# Patient Record
Sex: FEMALE | Race: OTHER | Employment: FULL TIME | URBAN - METROPOLITAN AREA
[De-identification: names, ages, dates, MRNs, and addresses within clinical notes are randomized per-mention and may not be internally consistent; named-entity substitution may affect disease eponyms.]

---

## 2017-04-05 ENCOUNTER — OFFICE VISIT (OUTPATIENT)
Dept: INTERNAL MEDICINE CLINIC | Facility: CLINIC | Age: 52
End: 2017-04-05

## 2017-04-05 ENCOUNTER — LAB ENCOUNTER (OUTPATIENT)
Dept: LAB | Age: 52
End: 2017-04-05
Attending: INTERNAL MEDICINE
Payer: COMMERCIAL

## 2017-04-05 VITALS — SYSTOLIC BLOOD PRESSURE: 124 MMHG | RESPIRATION RATE: 18 BRPM | TEMPERATURE: 98 F | DIASTOLIC BLOOD PRESSURE: 80 MMHG

## 2017-04-05 DIAGNOSIS — K21.9 GASTROESOPHAGEAL REFLUX DISEASE, ESOPHAGITIS PRESENCE NOT SPECIFIED: ICD-10-CM

## 2017-04-05 DIAGNOSIS — Z12.11 COLON CANCER SCREENING: ICD-10-CM

## 2017-04-05 DIAGNOSIS — R51.9 PERSISTENT HEADACHES: ICD-10-CM

## 2017-04-05 DIAGNOSIS — Z00.00 ANNUAL PHYSICAL EXAM: Primary | ICD-10-CM

## 2017-04-05 DIAGNOSIS — Z00.00 ANNUAL PHYSICAL EXAM: ICD-10-CM

## 2017-04-05 PROCEDURE — 99386 PREV VISIT NEW AGE 40-64: CPT | Performed by: INTERNAL MEDICINE

## 2017-04-05 PROCEDURE — 85025 COMPLETE CBC W/AUTO DIFF WBC: CPT

## 2017-04-05 PROCEDURE — 36415 COLL VENOUS BLD VENIPUNCTURE: CPT

## 2017-04-05 PROCEDURE — 80061 LIPID PANEL: CPT

## 2017-04-05 PROCEDURE — 85652 RBC SED RATE AUTOMATED: CPT

## 2017-04-05 PROCEDURE — 83036 HEMOGLOBIN GLYCOSYLATED A1C: CPT

## 2017-04-05 PROCEDURE — 83690 ASSAY OF LIPASE: CPT

## 2017-04-05 PROCEDURE — 99213 OFFICE O/P EST LOW 20 MIN: CPT | Performed by: INTERNAL MEDICINE

## 2017-04-05 PROCEDURE — 80053 COMPREHEN METABOLIC PANEL: CPT

## 2017-04-05 RX ORDER — PANTOPRAZOLE SODIUM 40 MG/1
40 TABLET, DELAYED RELEASE ORAL
Qty: 90 TABLET | Refills: 0 | Status: SHIPPED | OUTPATIENT
Start: 2017-04-05 | End: 2017-08-05

## 2017-04-05 NOTE — PROGRESS NOTES
HPI:    Patient ID: Rachel Murillo is a 45year old female. HPI Comments: Patient also here for physical.    Gastro-esophageal Reflux  She complains of belching, heartburn and nausea. She reports no coughing, no dysphagia or no hoarse voice.  This is a r nausea. Negative for dysphagia, vomiting, diarrhea, constipation, blood in stool and anal bleeding. Acid regurgitation to her mouth   Genitourinary: Negative. Neurological: Positive for headaches.  Negative for syncope, speech difficulty, weakness normal.   Reflex Scores:       Tricep reflexes are 2+ on the right side and 2+ on the left side. Bicep reflexes are 2+ on the right side and 2+ on the left side. Brachioradialis reflexes are 2+ on the right side and 2+ on the left side.        P

## 2017-04-12 ENCOUNTER — TELEPHONE (OUTPATIENT)
Dept: INTERNAL MEDICINE CLINIC | Facility: CLINIC | Age: 52
End: 2017-04-12

## 2017-04-12 DIAGNOSIS — E78.5 ELEVATED LIPIDS: Primary | ICD-10-CM

## 2017-04-12 DIAGNOSIS — R73.03 PREDIABETES: ICD-10-CM

## 2017-04-12 NOTE — TELEPHONE ENCOUNTER
LMTCB, ok to trans to [phone] today only or [id number]. Notes Recorded by Aguila Pacheco RN on 4/11/2017 at 4:32 PM  Called pt voicemail is full.   Notes Recorded by Randy Myers MD on 4/10/2017 at 8:20 AM  Notify pt; her a1c was 5.9 so she is prediab

## 2017-08-03 ENCOUNTER — TELEPHONE (OUTPATIENT)
Dept: INTERNAL MEDICINE CLINIC | Facility: CLINIC | Age: 52
End: 2017-08-03

## 2017-08-03 NOTE — TELEPHONE ENCOUNTER
Pt calling to request refill for       Current Outpatient Prescriptions:  Pantoprazole Sodium 40 MG Oral Tab EC Take 1 tablet (40 mg total) by mouth every morning before breakfast. Disp: 90 tablet Rfl: 0

## 2017-08-05 RX ORDER — PANTOPRAZOLE SODIUM 40 MG/1
40 TABLET, DELAYED RELEASE ORAL
Qty: 90 TABLET | Refills: 0 | Status: SHIPPED | OUTPATIENT
Start: 2017-08-05 | End: 2018-07-31

## 2017-08-05 NOTE — TELEPHONE ENCOUNTER
Refilled per written protocol.     Refill Protocol Appointment Criteria Gastrointestional Medication:    · Appointment scheduled in the past 12 months or in the next 3 months  Recent Outpatient Visits            4 months ago Annual physical exam    [place]

## 2019-06-20 ENCOUNTER — OFFICE VISIT (OUTPATIENT)
Dept: INTERNAL MEDICINE CLINIC | Facility: CLINIC | Age: 54
End: 2019-06-20
Payer: COMMERCIAL

## 2019-06-20 ENCOUNTER — HOSPITAL ENCOUNTER (OUTPATIENT)
Dept: GENERAL RADIOLOGY | Age: 54
Discharge: HOME | End: 2019-06-20
Attending: INTERNAL MEDICINE
Payer: COMMERCIAL

## 2019-06-20 VITALS
BODY MASS INDEX: 23.78 KG/M2 | WEIGHT: 134.19 LBS | RESPIRATION RATE: 18 BRPM | DIASTOLIC BLOOD PRESSURE: 79 MMHG | HEART RATE: 85 BPM | HEIGHT: 63 IN | SYSTOLIC BLOOD PRESSURE: 126 MMHG | OXYGEN SATURATION: 100 % | TEMPERATURE: 99 F

## 2019-06-20 DIAGNOSIS — R05.9 COUGH: ICD-10-CM

## 2019-06-20 DIAGNOSIS — R05.9 COUGH: Primary | ICD-10-CM

## 2019-06-20 PROCEDURE — 71046 X-RAY EXAM CHEST 2 VIEWS: CPT | Performed by: INTERNAL MEDICINE

## 2019-06-20 PROCEDURE — 99214 OFFICE O/P EST MOD 30 MIN: CPT | Performed by: INTERNAL MEDICINE

## 2019-06-20 PROCEDURE — 99212 OFFICE O/P EST SF 10 MIN: CPT | Performed by: INTERNAL MEDICINE

## 2019-06-20 RX ORDER — ATORVASTATIN CALCIUM 40 MG/1
40 TABLET, FILM COATED ORAL NIGHTLY
COMMUNITY
End: 2020-10-16

## 2019-06-20 RX ORDER — PANTOPRAZOLE SODIUM 40 MG/1
40 TABLET, DELAYED RELEASE ORAL
Qty: 90 TABLET | Refills: 0 | Status: SHIPPED | OUTPATIENT
Start: 2019-06-20 | End: 2020-10-16

## 2019-06-20 RX ORDER — CODEINE PHOSPHATE AND GUAIFENESIN 10; 100 MG/5ML; MG/5ML
5 SOLUTION ORAL EVERY 6 HOURS PRN
Qty: 240 ML | Refills: 0 | Status: SHIPPED | OUTPATIENT
Start: 2019-06-20 | End: 2020-10-16

## 2019-06-20 NOTE — PROGRESS NOTES
HPI:    Patient ID: Omaira Gomez is a 46year old female. Cough   This is a new problem. The current episode started in the past 7 days. The problem has been unchanged. The problem occurs every few hours. The cough is non-productive.  Associated symp the right lower field. She has no wheezes. She has no rales. Abdominal: Soft. Bowel sounds are normal. She exhibits no distension and no mass. There is no tenderness. There is no rebound and no guarding. Musculoskeletal: Normal range of motion.  She exh

## 2020-10-16 ENCOUNTER — NURSE TRIAGE (OUTPATIENT)
Dept: INTERNAL MEDICINE CLINIC | Facility: CLINIC | Age: 55
End: 2020-10-16

## 2020-10-16 ENCOUNTER — OFFICE VISIT (OUTPATIENT)
Dept: FAMILY MEDICINE CLINIC | Facility: CLINIC | Age: 55
End: 2020-10-16
Payer: COMMERCIAL

## 2020-10-16 VITALS
HEART RATE: 81 BPM | DIASTOLIC BLOOD PRESSURE: 76 MMHG | HEIGHT: 63 IN | WEIGHT: 135 LBS | SYSTOLIC BLOOD PRESSURE: 128 MMHG | BODY MASS INDEX: 23.92 KG/M2

## 2020-10-16 DIAGNOSIS — K21.9 GASTROESOPHAGEAL REFLUX DISEASE, UNSPECIFIED WHETHER ESOPHAGITIS PRESENT: ICD-10-CM

## 2020-10-16 DIAGNOSIS — R42 DIZZINESS: ICD-10-CM

## 2020-10-16 DIAGNOSIS — Z12.11 SCREENING FOR COLON CANCER: ICD-10-CM

## 2020-10-16 DIAGNOSIS — R03.0 SINGLE EPISODE OF ELEVATED BLOOD PRESSURE: ICD-10-CM

## 2020-10-16 DIAGNOSIS — Z00.00 ANNUAL PHYSICAL EXAM: Primary | ICD-10-CM

## 2020-10-16 DIAGNOSIS — Z12.31 ENCOUNTER FOR SCREENING MAMMOGRAM FOR MALIGNANT NEOPLASM OF BREAST: ICD-10-CM

## 2020-10-16 PROCEDURE — 99386 PREV VISIT NEW AGE 40-64: CPT | Performed by: NURSE PRACTITIONER

## 2020-10-16 PROCEDURE — 3008F BODY MASS INDEX DOCD: CPT | Performed by: NURSE PRACTITIONER

## 2020-10-16 PROCEDURE — 99203 OFFICE O/P NEW LOW 30 MIN: CPT | Performed by: NURSE PRACTITIONER

## 2020-10-16 PROCEDURE — 3078F DIAST BP <80 MM HG: CPT | Performed by: NURSE PRACTITIONER

## 2020-10-16 PROCEDURE — 3074F SYST BP LT 130 MM HG: CPT | Performed by: NURSE PRACTITIONER

## 2020-10-16 RX ORDER — PANTOPRAZOLE SODIUM 40 MG/1
40 TABLET, DELAYED RELEASE ORAL
Qty: 90 TABLET | Refills: 0 | Status: SHIPPED | OUTPATIENT
Start: 2020-10-16

## 2020-10-16 NOTE — PATIENT INSTRUCTIONS
Prevention Guidelines, Women Ages 48 to 59  Screening tests and vaccines are an important part of managing your health. A screening test is done to find possible disorders or diseases in people who don't have any symptoms.  The goal is to find a disease e Colorectal cancer All women at average risk in this age group Multiple tests are available and are used at different times.  Possible tests include:  · Flexible sigmoidoscopy every 5 years, or  · Colonoscopy every 10 years, or  · CT colonography (virtual co Chickenpox (varicella) All women in this age group who have no record of this infection or vaccine 2 doses; the second dose should be given at least 4 weeks after the first dose   Hepatitis A Women at increased risk for infection – talk with your healthcar Diet and exercise Women who are overweight or obese When diagnosed, and then at routine exams   Sexually transmitted infection prevention Women at increased risk for infection – talk with your healthcare provider At routine exams   Use of daily aspirin Wom © 9926-3364 [employer] [address] [facility], [facility] [place]. All rights reserved. This information is not intended as a substitute for professional medical care. Always follow your healthcare professional's instructions.         [employer] Dara last reviewed this educational content on 4/1/2020  © 8496-9486 [address]. [address] [facility] [place], [facility] [place]. All rights reserved. This information is not intended as a substitute for professional medical care.  Always magoo

## 2020-10-16 NOTE — PROGRESS NOTES
HPI  Pt here for elevated bp 178/94 this am   This is first time bp has been running high. Has been under a lot of stress.      Has not been to a doctor in a while-last visit 1 1/2 years ago  Over due for labs, mammogram  Last pap-over 4/25/2018-pap neg but Surgical History:   Procedure Laterality Date   • Rx ectop preg by laparoscope  2003    laproscopic salpingOSTOMY       Family History   Problem Relation Age of Onset   • Stroke Mother    • Diabetes Mother    • Lipids Mother    • Hypertension Mother Asked        Back Care: Not Asked        Exercise: Not Asked        Bike Helmet: Not Asked        Seat Belt: Not Asked        Self-Exams: Not Asked    Social History Narrative      Not on file      Current Outpatient Medications   Medication Sig Dispense R foods.  Try to get at least 150 minutes of exercise per week-a combination of weight resistance and cardio is preferred.     Screening labs  Mammogram ordered  Had flu shot  Refer colonoscopy           Relevant Orders    CBC, PLATELET; NO DIFFERENTIAL    CO

## 2020-10-17 ENCOUNTER — APPOINTMENT (OUTPATIENT)
Dept: LAB | Age: 55
End: 2020-10-17
Attending: NURSE PRACTITIONER
Payer: COMMERCIAL

## 2020-10-17 ENCOUNTER — LAB ENCOUNTER (OUTPATIENT)
Dept: LAB | Age: 55
End: 2020-10-17
Attending: NURSE PRACTITIONER
Payer: COMMERCIAL

## 2020-10-17 DIAGNOSIS — Z00.00 ANNUAL PHYSICAL EXAM: ICD-10-CM

## 2020-10-17 DIAGNOSIS — R03.0 SINGLE EPISODE OF ELEVATED BLOOD PRESSURE: ICD-10-CM

## 2020-10-17 PROCEDURE — 93010 ELECTROCARDIOGRAM REPORT: CPT | Performed by: NURSE PRACTITIONER

## 2020-10-17 PROCEDURE — 84443 ASSAY THYROID STIM HORMONE: CPT

## 2020-10-17 PROCEDURE — 80061 LIPID PANEL: CPT

## 2020-10-17 PROCEDURE — 82306 VITAMIN D 25 HYDROXY: CPT

## 2020-10-17 PROCEDURE — 82607 VITAMIN B-12: CPT

## 2020-10-17 PROCEDURE — 83036 HEMOGLOBIN GLYCOSYLATED A1C: CPT

## 2020-10-17 PROCEDURE — 85027 COMPLETE CBC AUTOMATED: CPT

## 2020-10-17 PROCEDURE — 36415 COLL VENOUS BLD VENIPUNCTURE: CPT

## 2020-10-17 PROCEDURE — 93005 ELECTROCARDIOGRAM TRACING: CPT

## 2020-10-17 PROCEDURE — 84481 FREE ASSAY (FT-3): CPT

## 2020-10-17 PROCEDURE — 84439 ASSAY OF FREE THYROXINE: CPT

## 2020-10-17 PROCEDURE — 80053 COMPREHEN METABOLIC PANEL: CPT

## 2020-10-20 ENCOUNTER — TELEPHONE (OUTPATIENT)
Dept: INTERNAL MEDICINE CLINIC | Facility: CLINIC | Age: 55
End: 2020-10-20

## 2020-10-20 DIAGNOSIS — I10 ESSENTIAL HYPERTENSION: ICD-10-CM

## 2020-10-20 DIAGNOSIS — E78.00 HYPERCHOLESTEROLEMIA: Primary | ICD-10-CM

## 2020-10-20 DIAGNOSIS — R73.09 ELEVATED HEMOGLOBIN A1C: ICD-10-CM

## 2020-10-20 DIAGNOSIS — E05.90 HYPERTHYROIDISM: ICD-10-CM

## 2020-10-20 DIAGNOSIS — R79.9 ELEVATED BUN: ICD-10-CM

## 2020-10-20 RX ORDER — ATORVASTATIN CALCIUM 10 MG/1
10 TABLET, FILM COATED ORAL NIGHTLY
Qty: 90 TABLET | Refills: 3 | Status: SHIPPED | OUTPATIENT
Start: 2020-10-20

## 2020-10-20 RX ORDER — LISINOPRIL AND HYDROCHLOROTHIAZIDE 12.5; 1 MG/1; MG/1
1 TABLET ORAL DAILY
Qty: 90 TABLET | Refills: 3 | Status: SHIPPED | OUTPATIENT
Start: 2020-10-20 | End: 2021-10-15

## 2020-10-20 NOTE — TELEPHONE ENCOUNTER
Pt was called and informed of message below and she verbalized understanding and appt was made.        Future Appointments   Date Time Provider Carol Baugh   10/21/2020  5:45 PM WALI De Oliveira [facility]     Written by Long Chang

## 2020-10-20 NOTE — TELEPHONE ENCOUNTER
Pt is anxious to know the results of her 10/17 labs as she states that her b/p is high 158/91 today and she thinks her cholesterol is high too. Please advise.

## 2020-10-20 NOTE — TELEPHONE ENCOUNTER
Ariela Garcia pt is calling again see April message below. Pt is very anxious to know her results but she will like to know  if you can prescribe her blood pressure medication because her blood was 158//94 and she does not feel well. She stated that she feels numb

## 2020-10-29 ENCOUNTER — OFFICE VISIT (OUTPATIENT)
Dept: INTERNAL MEDICINE CLINIC | Facility: CLINIC | Age: 55
End: 2020-10-29
Payer: COMMERCIAL

## 2020-10-29 VITALS
SYSTOLIC BLOOD PRESSURE: 120 MMHG | WEIGHT: 130 LBS | DIASTOLIC BLOOD PRESSURE: 76 MMHG | BODY MASS INDEX: 23 KG/M2 | HEART RATE: 85 BPM

## 2020-10-29 DIAGNOSIS — S46.911A STRAIN OF RIGHT SHOULDER, INITIAL ENCOUNTER: ICD-10-CM

## 2020-10-29 DIAGNOSIS — S16.1XXA STRAIN OF NECK MUSCLE, INITIAL ENCOUNTER: ICD-10-CM

## 2020-10-29 DIAGNOSIS — I10 ESSENTIAL HYPERTENSION: Primary | ICD-10-CM

## 2020-10-29 PROCEDURE — 3074F SYST BP LT 130 MM HG: CPT | Performed by: INTERNAL MEDICINE

## 2020-10-29 PROCEDURE — 3078F DIAST BP <80 MM HG: CPT | Performed by: INTERNAL MEDICINE

## 2020-10-29 PROCEDURE — 99214 OFFICE O/P EST MOD 30 MIN: CPT | Performed by: INTERNAL MEDICINE

## 2020-10-29 PROCEDURE — 99212 OFFICE O/P EST SF 10 MIN: CPT | Performed by: INTERNAL MEDICINE

## 2020-10-29 NOTE — PROGRESS NOTES
HPI:    Patient ID: Manuel Townsend is a 53year old female. Neck Pain   This is a new (right sided neck pain radiating to right shoulder) problem.  The current episode started today (pt had been takng care of mother, also did lot of food prep and cooki History   Problem Relation Age of Onset   • Stroke Mother    • Diabetes Mother    • Lipids Mother    • Hypertension Mother       Social History: Social History    Tobacco Use      Smoking status: Never Smoker      Smokeless tobacco: Never Used    Alcohol u use otc aspercreme with lidocaine and also aleve. Gave her note for work. Call back if pain persist/worsens.     (S46.628S) Strain of right shoulder, initial encounter  Plan: see above.        (I10) Essential hypertension  Plan: bp now controlled since star

## 2021-04-12 ENCOUNTER — TELEPHONE (OUTPATIENT)
Dept: INTERNAL MEDICINE CLINIC | Facility: CLINIC | Age: 56
End: 2021-04-12

## 2021-04-12 DIAGNOSIS — Z12.11 COLON CANCER SCREENING: Primary | ICD-10-CM

## 2021-04-12 DIAGNOSIS — Z01.419 WELL FEMALE EXAM WITH ROUTINE GYNECOLOGICAL EXAM: ICD-10-CM

## 2021-04-12 NOTE — TELEPHONE ENCOUNTER
pls call pt  She needs to do her mammogram and also screening colonoscopy. Also needs to see gyne for her papsmear  Referrals/order already in epic.

## 2021-04-15 NOTE — TELEPHONE ENCOUNTER
Called patient and left message for her to give us a call back to give her needed testing information.

## 2021-12-07 NOTE — TELEPHONE ENCOUNTER
Action Requested: Summary for Provider     []  Critical Lab, Recommendations Needed  [] Need Additional Advice  []   FYI    []   Need Orders  [] Need Medications Sent to Pharmacy  []  Other     SUMMARY: Per protocol advised office visit.   Scheduled today w Complex Repair And Bilobe Flap Text: The defect edges were debeveled with a #15 scalpel blade.  The primary defect was closed partially with a complex linear closure.  Given the location of the remaining defect, shape of the defect and the proximity to free margins a bilobe flap was deemed most appropriate for complete closure of the defect.  Using a sterile surgical marker, an appropriate advancement flap was drawn incorporating the defect and placing the expected incisions within the relaxed skin tension lines where possible.    The area thus outlined was incised deep to adipose tissue with a #15 scalpel blade.  The skin margins were undermined to an appropriate distance in all directions utilizing iris scissors.

## 2022-01-04 LAB — AMB EXT COVID-19 RESULT: DETECTED

## 2022-01-06 ENCOUNTER — TELEPHONE (OUTPATIENT)
Dept: INTERNAL MEDICINE CLINIC | Facility: CLINIC | Age: 57
End: 2022-01-06

## 2022-01-06 NOTE — TELEPHONE ENCOUNTER
Pt stated she tested positive for Covid 1/4/22,   S/s cough , sore throat,nasal congestion, advised to hydration ,quarrantine , treat s/s tylenol, saline gargle, cough syrup, appt made for RTW

## 2022-01-11 ENCOUNTER — TELEMEDICINE (OUTPATIENT)
Dept: INTERNAL MEDICINE CLINIC | Facility: CLINIC | Age: 57
End: 2022-01-11
Payer: COMMERCIAL

## 2022-01-11 DIAGNOSIS — U07.1 COVID-19 VIRUS INFECTION: Primary | ICD-10-CM

## 2022-01-11 PROCEDURE — 99212 OFFICE O/P EST SF 10 MIN: CPT | Performed by: INTERNAL MEDICINE

## 2022-01-11 NOTE — PROGRESS NOTES
Telephone Check-In    John Coley verbally consents to a Virtual/Telephone Check-In service on 01/11/22. Patient understands and accepts financial responsibility for any deductible, co-insurance and/or co-pays associated with this service.     Jose Dionte Velazco understands phone evaluation is not a substitute for face-to-face examination or emergency care. Patient advised to go to ER or call 911 for worsening symptoms or acute distress.

## 2023-10-10 LAB — AMB EXT COVID-19 RESULT: DETECTED

## 2023-10-13 ENCOUNTER — TELEPHONE (OUTPATIENT)
Dept: INTERNAL MEDICINE CLINIC | Facility: CLINIC | Age: 58
End: 2023-10-13

## 2023-10-13 NOTE — TELEPHONE ENCOUNTER
Pt stated tested Positive Covid on Tuesday- s/s started Tuesday  Cough, sore throat, runny nose , body ache only the first day  Taking nyquil seem to help, taking Vit C-cough is less, still have runny nose  Job allowing to come back after 10 days   Stated she is feeling better but not allowed to return until then  Stated will need letter from Dr to RTW    No appts available next week for video visit or OV

## 2023-10-13 NOTE — TELEPHONE ENCOUNTER
Ok to give letter for work  after 10 days from start of her illness as per her request.   Also remind pt overdue for her annual physical and health screening tests like mammogram, colonoscopy, and papsmear

## 2024-10-25 ENCOUNTER — TELEPHONE (OUTPATIENT)
Dept: INTERNAL MEDICINE CLINIC | Facility: CLINIC | Age: 59
End: 2024-10-25

## 2024-10-25 NOTE — TELEPHONE ENCOUNTER
Patient called in and was sent home today from work since she tested positive for covid. Patient said they gave her 10 days to see Dr with negative result. When holding for triage patient hung up. Please advise

## 2024-10-28 ENCOUNTER — TELEMEDICINE (OUTPATIENT)
Dept: INTERNAL MEDICINE CLINIC | Facility: CLINIC | Age: 59
End: 2024-10-28

## 2024-10-28 DIAGNOSIS — U07.1 COVID-19: Primary | ICD-10-CM

## 2024-10-28 NOTE — PROGRESS NOTES
Virtual Visit Check-In    MEENU SHORE or legal guardian   verbally consents to a Virtual Visit Check-In service on 10/28/2024    Patient understands and accepts financial responsibility for any deductible, co-insurance and/or co-pays associated with this service.    Duration of the service:   Call duration 10 minutes   Video visit    Chief Complaint   Patient presents with    Covid       HPI:    Patient ID: Meenu Shore is a 58 year old female. She presents for follow up. She tested positive for Covid-19 on 10/22/2024. She is a nurse at [employer]. She is requesting a letter to return to work. Per her place of employment she needs to be off for 10 days. Return to work on 11/2/2024. She did test negative on Sunday 11/27. She is not having any symptoms currently. She denies any SOB, chest pain or fevers.     ROS    Review of Systems   Constitutional:  Negative for fever.   HENT: Negative.     Respiratory:  Negative for cough, shortness of breath and wheezing.    Cardiovascular:  Negative for chest pain.   Gastrointestinal: Negative.    Genitourinary: Negative.    Musculoskeletal:  Negative for arthralgias.   Skin: Negative.    Neurological: Negative.    Psychiatric/Behavioral: Negative.               Current Outpatient Medications   Medication Sig Dispense Refill    atorvastatin 10 MG Oral Tab Take 1 tablet (10 mg total) by mouth nightly. 90 tablet 3    Pantoprazole Sodium 40 MG Oral Tab EC Take 1 tablet (40 mg total) by mouth every morning before breakfast. 90 tablet 0       Allergies:Allergies[1]       Current Outpatient Medications:     atorvastatin 10 MG Oral Tab, Take 1 tablet (10 mg total) by mouth nightly., Disp: 90 tablet, Rfl: 3    Pantoprazole Sodium 40 MG Oral Tab EC, Take 1 tablet (40 mg total) by mouth every morning before breakfast., Disp: 90 tablet, Rfl: 0    Past Medical History:    Allergy    Ectopic pregnancy (HCC)    laproscopic salpingostomy    Gynecologic cancer  (HCC)    Hyperlipidemia         PHYSICAL EXAM:     Vitals signs taken at home if available:    Limited examination for this telephone visit due to the coronavirus emergency    Patient was speaking in complete sentences, no increased work of breathing and very coherent and alert on the phone.  Alert and oriented x 3  Patient was responding to questions appropriately.  Patient did not appear short of breath.    ASSESSMENT/PLAN:     Encounter Diagnosis   Name Primary?    COVID-19 Yes       1. COVID-19  - return to work   - letter completed. Patient will  at [facility] on 10/29.       Patient reminded to practice good health and safety measures including washing hands, social distancing, covering mouth when coughing/ sneezing, avoid social meetings/ gatherings in face of this Covid 19 pandemic.    Patient verbalized understanding of plan and all questions answered to the best of my ability.    Patient to call back if any change/ worsening of symptoms.    No orders of the defined types were placed in this encounter.      Meds This Visit:  Requested Prescriptions      No prescriptions requested or ordered in this encounter       Imaging & Referrals:  None               WALI Quezada  10/28/2024  2:27 PM      Please note that the following visit was completed using two-way, real-time interactive audio and/or video communication.  This has been done in good fernando to provide continuity of care in the best interest of the provider-patient relationship, due to the ongoing public health crisis/national emergency and because of restrictions of visitation.  There are limitations of this visit as no physical exam could be performed.  Every conscious effort was taken to allow for sufficient and adequate time.  This billing was spent on reviewing labs, medications, radiology tests and decision making.  Appropriate medical decision-making and tests are ordered as detailed in the plan of care above  ID#[id number]        [1] No Known Allergies

## 2024-10-28 NOTE — TELEPHONE ENCOUNTER
Spoke with patient.  States her symptoms are improving.  Needs note to return to work.  Virtual visit booked today.

## 2025-04-02 ENCOUNTER — NURSE TRIAGE (OUTPATIENT)
Dept: INTERNAL MEDICINE CLINIC | Facility: CLINIC | Age: 60
End: 2025-04-02

## 2025-04-02 ENCOUNTER — APPOINTMENT (OUTPATIENT)
Dept: GENERAL RADIOLOGY | Age: 60
End: 2025-04-02
Attending: PHYSICIAN ASSISTANT
Payer: COMMERCIAL

## 2025-04-02 ENCOUNTER — HOSPITAL ENCOUNTER (OUTPATIENT)
Age: 60
Discharge: HOME | End: 2025-04-02
Payer: COMMERCIAL

## 2025-04-02 VITALS
TEMPERATURE: 98 F | HEART RATE: 85 BPM | SYSTOLIC BLOOD PRESSURE: 146 MMHG | RESPIRATION RATE: 20 BRPM | OXYGEN SATURATION: 98 % | DIASTOLIC BLOOD PRESSURE: 83 MMHG

## 2025-04-02 DIAGNOSIS — M25.562 ACUTE PAIN OF LEFT KNEE: Primary | ICD-10-CM

## 2025-04-02 DIAGNOSIS — R26.81 GAIT INSTABILITY: ICD-10-CM

## 2025-04-02 PROCEDURE — 99213 OFFICE O/P EST LOW 20 MIN: CPT | Performed by: PHYSICIAN ASSISTANT

## 2025-04-02 PROCEDURE — 96372 THER/PROPH/DIAG INJ SC/IM: CPT | Performed by: PHYSICIAN ASSISTANT

## 2025-04-02 PROCEDURE — 73562 X-RAY EXAM OF KNEE 3: CPT | Performed by: PHYSICIAN ASSISTANT

## 2025-04-02 RX ORDER — KETOROLAC TROMETHAMINE 30 MG/ML
15 INJECTION, SOLUTION INTRAMUSCULAR; INTRAVENOUS ONCE
Status: COMPLETED | OUTPATIENT
Start: 2025-04-02 | End: 2025-04-02

## 2025-04-02 RX ORDER — NAPROXEN 500 MG/1
500 TABLET ORAL 2 TIMES DAILY PRN
Qty: 30 TABLET | Refills: 0 | Status: SHIPPED | OUTPATIENT
Start: 2025-04-02

## 2025-04-02 RX ORDER — TRAMADOL HYDROCHLORIDE 50 MG/1
50 TABLET ORAL EVERY 6 HOURS PRN
Qty: 10 TABLET | Refills: 0 | Status: SHIPPED | OUTPATIENT
Start: 2025-04-02

## 2025-04-02 NOTE — ED PROVIDER NOTES
Patient Seen in: [facility]      History     Chief Complaint   Patient presents with    Knee Pain     Stated Complaint: Left knee pain    Subjective:   HPI      Patient is a 59-year-old female with hypertension, hyperlipidemia, GERD, presenting to immediate care for evaluation of acute atraumatic left anterior knee pain for 4 days.  Pain occurred while at work.  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  She denies any specific trauma or injury or fall.  States does do a lot of bending and lifting and walking.  Associated: swelling. No redness or warmth. No rash. No numbness or weakness or paresthesias.  Brother has gout. Took his indomethacin every 8 hours for several days with slight improvement.  Stopped taking since states needed to be evaluated. Denies alcohol use. Does endorse recent consumption red meat. No prior knee problems. She is limping due to knee pain and swelling. Apply Biofreeze. No fevers. No chest pain or shortness of breath.         Objective:     Past Medical History:    Allergy    Ectopic pregnancy (HCC)    laproscopic salpingostomy    Gynecologic cancer (HCC)    Hyperlipidemia              Past Surgical History:   Procedure Laterality Date    Rx ectop preg by laparoscope  2003    laproscopic salpingOSTOMY                Social History     Socioeconomic History    Marital status:    Tobacco Use    Smoking status: Never    Smokeless tobacco: Never   Substance and Sexual Activity    Alcohol use: Yes     Comment: occasionally   Other Topics Concern    Caffeine Concern No              Review of Systems   Constitutional:  Negative for chills and fever.   Respiratory:  Negative for shortness of breath.    Cardiovascular:  Negative for chest pain and leg swelling.   Gastrointestinal:  Negative for abdominal pain, nausea and vomiting.   Musculoskeletal:  Positive for gait problem and joint swelling.        Left knee pain and swelling   Skin:  Negative for color change, pallor, rash  and wound.   Allergic/Immunologic: Negative for immunocompromised state.   Neurological:  Negative for weakness and numbness.   Psychiatric/Behavioral:  Negative for confusion.    All other systems reviewed and are negative.      Positive for stated complaint: Left knee pain  Other systems are as noted in HPI.  Constitutional and vital signs reviewed.      All other systems reviewed and negative except as noted above.    Physical Exam     ED Triage Vitals [04/02/25 1100]   /83   Pulse 85   Resp 20   Temp 97.8 °F (36.6 °C)   Temp src Oral   SpO2 98 %   O2 Device None (Room air)       Current Vitals:   Vital Signs  BP: 146/83  Pulse: 85  Resp: 20  Temp: 97.8 °F (36.6 °C)  Temp src: Oral    Oxygen Therapy  SpO2: 98 %  O2 Device: None (Room air)        Physical Exam  Vitals and nursing note reviewed.   Constitutional:       General: She is not in acute distress.     Appearance: Normal appearance. She is not ill-appearing, toxic-appearing or diaphoretic.   HENT:      Head: Normocephalic and atraumatic.      Mouth/Throat:      Mouth: Mucous membranes are moist.   Eyes:      Conjunctiva/sclera: Conjunctivae normal.   Cardiovascular:      Rate and Rhythm: Normal rate.      Pulses: Normal pulses.   Pulmonary:      Effort: Pulmonary effort is normal. No respiratory distress.   Musculoskeletal:         General: Swelling and tenderness present. No deformity or signs of injury. Normal range of motion.      Cervical back: Normal range of motion.      Right lower leg: No edema.      Left lower leg: No edema.      Comments: Tenderness to palpation left anterior knee with prepatellar soft tissue swelling.  There is no erythema or warmth.  No ecchymosis.  No hematoma.  No rash.  Motion.  Only laxity.  Quads extensor intact.  No tenderness to palpation posterior knee.  No Baker's cyst.  No calf tenderness or swelling.   Skin:     Findings: No erythema or rash.   Neurological:      General: No focal deficit present.      Mental  Status: She is alert and oriented to person, place, and time.      Motor: No weakness.      Gait: Gait abnormal.   Psychiatric:         Mood and Affect: Mood normal.         Behavior: Behavior normal.           ED Course   Labs Reviewed - No data to display  XR KNEE (3 VIEWS), LEFT (CPT=73562)   Final Result   PROCEDURE: XR KNEE ROUTINE (3 VIEWS), LEFT (CPT=73562)       COMPARISON: None.       INDICATIONS: Atrumatic left anterior knee pain x 4 days       Findings and impression:  Normal alignment.  No fracture.  No significant    effusion   Finalized by (CST): Jared Cruz MD on 4/02/2025 at 11:57 AM                                 [facility]     Patient is a 59-year-old female, history above, presenting to immediate care for evaluation of acute atraumatic left anterior knee pain for approximately 4 days while at work.  Patient works as a CNA.  She denies any specific trauma or injury or fall.  Does do a lot bending, lifting, and walking at work.  Patient denies prior episodes.  She is concerned may be related to possible count.  Brother with history of gout in which she takes  indomethacin.  Did take for several days with slight relief.  Now causing her to limp on affected leg and unable to sleep secondary to pain.  She denies any numbness weakness paresthesias.  No fever or systemic symptoms.  Patient is alert, cooperative, pleasant, nontoxic-appearing, appears no acute distress.  Accompanied by family, patient in wheelchair.  Patient tender to palpation left anterior knee with prepatellar soft tissue swelling minimal.  No obvious deformity or knee effusion on exam.  Has normal range of motion of knee without knee laxity.  Neurovasc intact and compartments soft without calf tenderness or swelling.  Negative Homans' sign.  No lower extremity edema.  No fever or systemic symptoms.  Patient given IM Toradol for pain with symptomatic improvement.  Ace wrap for comfort.  Further evaluation for acute left knee pain with x-ray  imaging negative for acute fracture dislocation or osseous abnormality.  No significant soft tissue swelling or knee effusion on x-ray imaging.  Discussed outpatient follow-up with uric acid level testing, not available in our clinic.  Patient without erythema or warmth.  No clinical signs of septic joint.  Will treat with naproxen and tramadol and Voltaren gel for pain.  Ace wrap for comfort. Declining cane for ambulatory aid. Activity as tolerated.  Excuse from work for several days until symptoms improved.  Further PCP follow-up.  Stable for discharge      Medical Decision Making      Disposition and Plan     Clinical Impression:  1. Acute pain of left knee    2. Gait instability         Disposition:  Discharge  4/2/2025 12:09 pm    Follow-up:  Ephraim White MD  [address] [place] [address]  [phone]                Medications Prescribed:  Current Discharge Medication List        START taking these medications    Details   naproxen 500 MG Oral Tab Take 1 tablet (500 mg total) by mouth 2 (two) times daily as needed.  Qty: 30 tablet, Refills: 0      diclofenac 1 % External Gel Apply 2 g topically 4 (four) times daily.  Qty: 100 g, Refills: 0      traMADol 50 MG Oral Tab Take 1 tablet (50 mg total) by mouth every 6 (six) hours as needed for Pain.  Qty: 10 tablet, Refills: 0    Associated Diagnoses: Acute pain of left knee                 Supplementary Documentation:

## 2025-04-02 NOTE — TELEPHONE ENCOUNTER
I received a call from patient that she went to [facility] immediate care and was inform that she has no appointment and that they can not see her as she is out of network.  Patient was given a appointment to see Dr. Pizano. Address was given to sister on the phone with patient.     Future Appointments   Date Time Provider Department Center   4/2/2025  1:30 PM [facility]

## 2025-04-02 NOTE — TELEPHONE ENCOUNTER
Per provider communication preference reference we are to send message secure chat/webex to see if you are able to add on prior to offering another provider. Please advise.     Direct message sent     Per Dr. White: unable to add on today. Okay to schedule with another provider.     Patient notified, verbalized understanding.   No availability in [place]. Patient declining alternative office and will go to immediate care per patient preference.     Action Requested: Summary for Provider     []  Critical Lab, Recommendations Needed  [] Need Additional Advice  []   FYI    []   Need Orders  [] Need Medications Sent to Pharmacy  [x]  Other: appointment request      SUMMARY: Recommended visit today, patient is requesting add on visit with Dr. White     Reason for call: Knee Pain (/)  Onset: 3 days     Left knee pain, x3 days, unknown if she had a injury. Swelling and warmth is present, but denies redness. Patient is able to bear wwewight but it is painful, rating pain 8/10, and states she did not sleep last night due to pain. Afebrile.     Reason for Disposition   SEVERE pain (e.g., excruciating, unable to walk)    Protocols used: Knee Pain-A-[place]

## 2025-04-02 NOTE — DISCHARGE INSTRUCTIONS
Prescription for naproxen daily for pain.  Prescription is a medication and anti-inflammatory.  In addition prescription for narcotic tramadol as needed for pain may take every 6-8 hours.  10 tabs total prescribed.  May apply topical Voltaren gel for comfort.  Ace wrap for comfort avoid heavy lifting jumping or activities for several days.  Further work note excuse off until Monday, April 7, 2025 provided.    May consider outpatient uric acid level testing with outpatient if ongoing symptoms or personal concern for underlying gout.

## 2025-04-21 ENCOUNTER — TELEPHONE (OUTPATIENT)
Dept: INTERNAL MEDICINE CLINIC | Facility: CLINIC | Age: 60
End: 2025-04-21

## 2025-04-21 NOTE — TELEPHONE ENCOUNTER
Patient would like to ask  if he can order MRI for left knee. Patient made appointment 5/10/25 and already has XR of knee done. Please advise

## 2025-04-22 NOTE — TELEPHONE ENCOUNTER
Can't order a test without seeing pt;  insurance may deny it without my evaluatoin of the patient;

## 2025-05-10 ENCOUNTER — OFFICE VISIT (OUTPATIENT)
Dept: INTERNAL MEDICINE CLINIC | Facility: CLINIC | Age: 60
End: 2025-05-10

## 2025-05-10 VITALS
HEART RATE: 77 BPM | WEIGHT: 132 LBS | SYSTOLIC BLOOD PRESSURE: 146 MMHG | BODY MASS INDEX: 23 KG/M2 | DIASTOLIC BLOOD PRESSURE: 90 MMHG

## 2025-05-10 DIAGNOSIS — Z00.00 ANNUAL PHYSICAL EXAM: Primary | ICD-10-CM

## 2025-05-10 DIAGNOSIS — M25.562 ACUTE PAIN OF LEFT KNEE: ICD-10-CM

## 2025-05-10 DIAGNOSIS — Z13.6 SCREENING FOR HEART DISEASE: ICD-10-CM

## 2025-05-10 DIAGNOSIS — Z53.20 COLON CANCER SCREENING DECLINED: ICD-10-CM

## 2025-05-10 DIAGNOSIS — Z53.20 MAMMOGRAM DECLINED: ICD-10-CM

## 2025-05-10 DIAGNOSIS — Z53.20 CERVICAL CANCER SCREENING DECLINED: ICD-10-CM

## 2025-05-10 DIAGNOSIS — K21.9 GASTROESOPHAGEAL REFLUX DISEASE, UNSPECIFIED WHETHER ESOPHAGITIS PRESENT: ICD-10-CM

## 2025-05-10 RX ORDER — TRAZODONE HYDROCHLORIDE 50 MG/1
50 TABLET ORAL NIGHTLY
Qty: 30 TABLET | Refills: 0 | Status: SHIPPED | OUTPATIENT
Start: 2025-05-10

## 2025-05-10 RX ORDER — PANTOPRAZOLE SODIUM 40 MG/1
40 TABLET, DELAYED RELEASE ORAL
Qty: 90 TABLET | Refills: 0 | Status: SHIPPED | OUTPATIENT
Start: 2025-05-10

## 2025-05-10 RX ORDER — AMLODIPINE BESYLATE 5 MG/1
5 TABLET ORAL DAILY
Qty: 90 TABLET | Refills: 0 | Status: SHIPPED | OUTPATIENT
Start: 2025-05-10

## 2025-05-10 NOTE — PROGRESS NOTES
Subjective:     Patient ID: Meenu Shore is a 59 year old female.    Pt presents today with complaint of left knee pain (see below); pt also due for her annual physical.     Knee Pain   The pain is present in the right knee. This is a new problem. The current episode started more than 1 month ago. There has been no history of extremity trauma. The problem occurs intermittently. The problem has been waxing and waning. The pain is moderate. Associated symptoms include joint swelling, a limited range of motion and stiffness. Pertinent negatives include no fever, inability to bear weight or joint locking.       History/Other:   Review of Systems   Constitutional: Negative.  Negative for fever.   HENT: Negative.     Eyes: Negative.    Respiratory: Negative.     Cardiovascular: Negative.    Gastrointestinal: Negative.    Genitourinary: Negative.    Musculoskeletal:  Positive for stiffness.   Allergic/Immunologic: Negative for immunocompromised state.   Hematological: Negative.      Current Medications[1]  Allergies:Allergies[2]    Past Medical History[3]   Past Surgical History[4]   Family History[5]   Social History: Short Social Hx on File[6]     Objective:   Physical Exam  Constitutional:       General: She is not in acute distress.     Appearance: She is well-developed. She is not ill-appearing, toxic-appearing or diaphoretic.   HENT:      Head: Normocephalic and atraumatic.      Right Ear: External ear normal.      Left Ear: External ear normal.      Nose: Nose normal.      Mouth/Throat:      Pharynx: No oropharyngeal exudate.   Eyes:      General:         Right eye: No discharge.         Left eye: No discharge.      Conjunctiva/sclera: Conjunctivae normal.      Pupils: Pupils are equal, round, and reactive to light.   Neck:      Vascular: No carotid bruit or JVD.   Cardiovascular:      Rate and Rhythm: Normal rate and regular rhythm.      Heart sounds: Normal heart sounds. No murmur heard.  Pulmonary:       Effort: Pulmonary effort is normal. No respiratory distress.      Breath sounds: Normal breath sounds. No wheezing or rales.   Abdominal:      General: A surgical scar is present. Bowel sounds are normal. There is no distension.      Palpations: Abdomen is soft. There is no hepatomegaly, splenomegaly, mass or pulsatile mass.      Tenderness: There is no abdominal tenderness. There is no guarding or rebound.   Musculoskeletal:      Cervical back: Normal range of motion and neck supple. No rigidity or tenderness.      Left knee: No deformity, effusion, erythema, ecchymosis or bony tenderness. Decreased range of motion. Tenderness present over the lateral joint line. No LCL laxity, MCL laxity, ACL laxity or PCL laxity.Normal pulse.      Instability Tests: Anterior drawer test negative. Posterior drawer test negative.      Right lower leg: Normal. No edema.      Left lower leg: No swelling. No edema.   Lymphadenopathy:      Cervical: No cervical adenopathy.   Skin:     General: Skin is warm and dry.      Findings: No rash.   Neurological:      Mental Status: She is alert and oriented to person, place, and time.         Assessment & Plan:   (Z00.00) Annual physical exam  (primary encounter diagnosis)  Plan: Hemoglobin A1C [E], CBC With Differential With         Platelet, Comp Metabolic Panel (14), Uric Acid,        TSH W Reflex To Free T4, Lipid Panel        Routine labs ordered.      (M25.562) Acute pain of left knee  Plan: MRI KNEE, LEFT (CPT=73721)        No trauma; xray no significant arthritis; order mri    (K21.9) Gastroesophageal reflux disease, unspecified whether esophagitis present  Plan: pantoprazole 40 MG Oral Tab EC        Pt stable on PPI prn.     (Z53.20) Mammogram declined  Plan: pt declined mammogram.     (Z53.20) Colon cancer screening declined  Plan: pt declined colon screening .    (Z53.20) Cervical cancer screening declined  Plan: pt declined papsmear    (Z13.6) Screening for heart disease  Plan: pt  agreed to do CAC test.        No orders of the defined types were placed in this encounter.      Meds This Visit:  Requested Prescriptions     Pending Prescriptions Disp Refills    pantoprazole 40 MG Oral Tab EC 90 tablet 0     Sig: Take 1 tablet (40 mg total) by mouth every morning before breakfast.       Imaging & Referrals:  None            [1]   Current Outpatient Medications   Medication Sig Dispense Refill    naproxen 500 MG Oral Tab Take 1 tablet (500 mg total) by mouth 2 (two) times daily as needed. (Patient not taking: Reported on 5/10/2025) 30 tablet 0    diclofenac 1 % External Gel Apply 2 g topically 4 (four) times daily. (Patient not taking: Reported on 5/10/2025) 100 g 0    traMADol 50 MG Oral Tab Take 1 tablet (50 mg total) by mouth every 6 (six) hours as needed for Pain. (Patient not taking: Reported on 5/10/2025) 10 tablet 0    atorvastatin 10 MG Oral Tab Take 1 tablet (10 mg total) by mouth nightly. (Patient not taking: Reported on 5/10/2025) 90 tablet 3    Pantoprazole Sodium 40 MG Oral Tab EC Take 1 tablet (40 mg total) by mouth every morning before breakfast. (Patient not taking: Reported on 5/10/2025) 90 tablet 0   [2] No Known Allergies  [3]   Past Medical History:   Allergy    Ectopic pregnancy (HCC)    laproscopic salpingostomy    Gynecologic cancer (HCC)    Hyperlipidemia   [4]   Past Surgical History:  Procedure Laterality Date    Rx ectop preg by laparoscope  2003    laproscopic salpingOSTOMY   [5]   Family History  Problem Relation Age of Onset    Stroke Mother     Diabetes Mother     Lipids Mother     Hypertension Mother    [6]   Social History  Socioeconomic History    Marital status:    Tobacco Use    Smoking status: Never    Smokeless tobacco: Never   Substance and Sexual Activity    Alcohol use: Yes     Comment: occasionally   Other Topics Concern    Caffeine Concern No

## 2025-07-16 ENCOUNTER — TELEPHONE (OUTPATIENT)
Dept: INTERNAL MEDICINE CLINIC | Facility: CLINIC | Age: 60
End: 2025-07-16

## 2025-07-16 NOTE — TELEPHONE ENCOUNTER
Left voice message informing patient she is due for a follow up appointment. Advised patient to contact the office to schedule an appointment.

## (undated) NOTE — LETTER
6/20/2019              [place] [id number]         To whom it may concern,    This is to certify that Ms Tu Gant was seen and evaluated in our clinic today.  She should be excused from work from

## (undated) NOTE — LETTER
10/13/2023      To Whom It May Concern: Corby Fall is currently under my medical care and may not return to work at this time. She had been diagnosed with Covid-19 infection on 10/10/23 and continues to recover from her infection. She may return to work on 10/23/23. If you require additional information please contact our office.             Sincerely,    Terri Vickers MD  [address] [place] [address]   [phone]                Document generated by:  Courtney Addison RN

## (undated) NOTE — LETTER
10/28/2024          To Whom It May Concern:    Meenu Shore is currently under my medical care and may not return to work at this time.    Please excuse Meenu for 10 days.  She may return to work on 11/2/2024.  Activity is restricted as follows: none.    If you require additional information please contact our office.        Sincerely,         WALI Quezada          Document generated by:  WALI Quezada

## (undated) NOTE — LETTER
10/16/2020              [address] [place] [id number]         To Whom it may concern: This is to certify that Bea Kumar had an appointment on 10/16/2020 at 10:40 AM with WALI Wilson.   She may r

## (undated) NOTE — MR AVS SNAPSHOT
[address], [address]  [address] [facility]  [phone]               Thank you for choosing us for your health care visit with Mary Jo Mckeon MD.  We are glad to serve you and happy to provide you with this s Follow-up Instructions     Return in about 4 weeks (around 5/3/2017), or if symptoms worsen or fail to improve.       Scheduling Instructions     Wednesday April 05, 2017     Imaging:  CT BRAIN OR HEAD ([id number])    Instructions:  [facility] requirements for authorization, please wait 5-7 days and then contact your physician's office. At that time, you will be provided with any authorization numbers or be assured that none are required. You can then schedule your appointment.  Failure to obtain If you have questions, you can call [phone] to talk to our [facility] Staff. Remember, [employer] is NOT to be used for urgent needs. For medical emergencies, dial 911.         Educational Information     Healthy Diet and Regular Exercise  The F

## (undated) NOTE — LETTER
11/16/20        [address] [place] [id number]      Dear Meenakshi Ventura,    [address] [facility] records indicate that you have outstanding lab work and or testing that was ordered for you and has not yet been completed:  Orders Placed This Encounter

## (undated) NOTE — LETTER
12/19/17        [address]      Dear Lacey Goldman,    [facility] records indicate that you have outstanding lab work and or testing that was ordered for you and has not yet been completed:          Lipid Panel [E]      D

## (undated) NOTE — LETTER
1/11/2022              [place] [id number]         To whom it may concern,      This is to certify that Ms Adam Yepez is our patient and under our care.  She had been diagnosed with covid 19 infecti

## (undated) NOTE — LETTER
10/29/2020              Irina Chappell        [address] [facility] [place] [address]         To whom it may concern,       This is to certify that  Ms Tarsha Licona was seen and evaluated today.  She should be excused from work from Oct 29,20

## (undated) NOTE — LETTER
Date & Time: 4/2/2025, 12:03 PM  Patient: Meenu Shore  Encounter Provider(s):    Tono Troncoso PA       To Whom It May Concern:    Meenu Shore was seen and treated in our department on 04/2/2025.  Please excuse from work until Monday 07, 2025.  No restrictions.        If you have any questions or concerns, please do not hesitate to call.        _____________________________  Physician/APC Signature

## (undated) NOTE — Clinical Note
April 19, 2017     [address]       Dear Erik Contreras: The following are the results of your recent tests. Please review the list of test results.   Your result is the value on the left; we have also supplied the Result Value Ref Range   Sed Rate 7 0-30 mm/Hr   -CBC W/ DIFFERENTIAL   Result Value Ref Range   WBC 3.3 (L) 4.0-11.0 K/UL   RBC 4.35 3.70-5.40 M/UL   HGB 13.3 12.0-16.0 g/dL   HCT 40.1 35.0-48.0 %   MCV 92.2 80.0-100.0 fL   MCH 30.5 27.0-32.0 pg   MCHC 33